# Patient Record
Sex: MALE | Race: WHITE | NOT HISPANIC OR LATINO | URBAN - METROPOLITAN AREA
[De-identification: names, ages, dates, MRNs, and addresses within clinical notes are randomized per-mention and may not be internally consistent; named-entity substitution may affect disease eponyms.]

---

## 2024-09-27 ENCOUNTER — TELEPHONE ENCOUNTER (OUTPATIENT)
Dept: URBAN - METROPOLITAN AREA CLINIC 35 | Facility: CLINIC | Age: 84
End: 2024-09-27

## 2024-09-27 ENCOUNTER — LAB OUTSIDE AN ENCOUNTER (OUTPATIENT)
Dept: URBAN - METROPOLITAN AREA CLINIC 31 | Facility: CLINIC | Age: 84
End: 2024-09-27

## 2024-09-27 ENCOUNTER — DASHBOARD ENCOUNTERS (OUTPATIENT)
Age: 84
End: 2024-09-27

## 2024-09-27 ENCOUNTER — OFFICE VISIT (OUTPATIENT)
Dept: URBAN - METROPOLITAN AREA CLINIC 31 | Facility: CLINIC | Age: 84
End: 2024-09-27
Payer: MEDICARE

## 2024-09-27 VITALS
HEART RATE: 74 BPM | BODY MASS INDEX: 22.48 KG/M2 | OXYGEN SATURATION: 96 % | DIASTOLIC BLOOD PRESSURE: 80 MMHG | WEIGHT: 166 LBS | HEIGHT: 72 IN | SYSTOLIC BLOOD PRESSURE: 140 MMHG

## 2024-09-27 DIAGNOSIS — E61.1 IRON DEFICIENCY: ICD-10-CM

## 2024-09-27 DIAGNOSIS — Z86.79 HISTORY OF ATRIAL FIBRILLATION: ICD-10-CM

## 2024-09-27 PROCEDURE — 99204 OFFICE O/P NEW MOD 45 MIN: CPT | Performed by: INTERNAL MEDICINE

## 2024-09-27 RX ORDER — APIXABAN 5 MG/1
AS DIRECTED TABLET, FILM COATED ORAL
Status: ACTIVE | COMMUNITY

## 2024-09-27 RX ORDER — TADALAFIL 10 MG/1
1 TABLET AS NEEDED TABLET, FILM COATED ORAL ONCE A DAY
Status: ACTIVE | COMMUNITY

## 2024-09-27 RX ORDER — ALLOPURINOL 100 MG/1
1 TABLET TABLET ORAL ONCE A DAY
Status: ACTIVE | COMMUNITY

## 2024-09-27 RX ORDER — ATORVASTATIN CALCIUM 40 MG/1
1 TABLET TABLET, FILM COATED ORAL ONCE A DAY
Status: ACTIVE | COMMUNITY

## 2024-09-27 NOTE — HPI-TODAY'S VISIT:
84-year-old male patient with previous history of  low iron presents today for consultation about Iron deficient   Denies a family history of esophageal, gastric, colon or rectal cancer.  Admits a past Colonoscopy and denies EGD.     Patient currently  admits 1 bowel movement per day with  normal consistency without pain, melena, blood, or mucus.    Patient admits having A-Fib, diagnosed approximately 2 years ago,  initially on Xarelto, and now has been taking Eliquis for 1 year. Of note patient is wearing a heart monitor (on day 15 from 30 days) Cardiologist Dr Yves Barraza ([facility] Cardiologist). Patient found to have 3-second pauses on EKG so heart monitor on.  Patient has been taking BC Powders at least 1-2 times a day, for right knee pain; states BC powders is the only thing that helps the pain. Patient has had 3 right knee surgeries.  No N/V No dysphagia Rare heartburn No weight loss No changes in bowels other than stools recently have been hard. Patient has been drinking a  protein and fruit drink   Labs - 09/04/2024  Fecal globin: WNL  CBC: WNL (Hbg 13.5 and HCT 41.6)    Labs 08/01/2023  Fe + TIBC + Ferritin: WNL  Lipid panel: WNL  CMP: WNL  HFP: WNL  TSH: WNL  CBC: WNL (Hbg 14.7 and HCT 44.4)

## 2024-11-15 ENCOUNTER — TELEPHONE ENCOUNTER (OUTPATIENT)
Dept: URBAN - METROPOLITAN AREA CLINIC 35 | Facility: CLINIC | Age: 84
End: 2024-11-15

## 2024-11-15 ENCOUNTER — OFFICE VISIT (OUTPATIENT)
Dept: URBAN - METROPOLITAN AREA MEDICAL CENTER 10 | Facility: MEDICAL CENTER | Age: 84
End: 2024-11-15
Payer: MEDICARE

## 2024-11-15 DIAGNOSIS — D12.3 ADENOMA OF TRANSVERSE COLON: ICD-10-CM

## 2024-11-15 DIAGNOSIS — K29.50 ANTRAL GASTRITIS: ICD-10-CM

## 2024-11-15 DIAGNOSIS — D50.9 ANEMIA: ICD-10-CM

## 2024-11-15 DIAGNOSIS — K31.819 ACQUIRED ARTERIOVENOUS MALFORMATION OF DUODENUM: ICD-10-CM

## 2024-11-15 PROCEDURE — 45380 COLONOSCOPY AND BIOPSY: CPT | Performed by: INTERNAL MEDICINE

## 2024-11-15 PROCEDURE — 43270 EGD LESION ABLATION: CPT | Performed by: INTERNAL MEDICINE

## 2024-11-15 PROCEDURE — 43239 EGD BIOPSY SINGLE/MULTIPLE: CPT | Performed by: INTERNAL MEDICINE

## 2024-11-15 RX ORDER — PANTOPRAZOLE SODIUM 40 MG/1
1 TABLET TABLET, DELAYED RELEASE ORAL
Qty: 30 | Refills: 2 | OUTPATIENT
Start: 2024-11-15

## 2024-11-15 RX ORDER — TADALAFIL 10 MG/1
1 TABLET AS NEEDED TABLET, FILM COATED ORAL ONCE A DAY
Status: ACTIVE | COMMUNITY

## 2024-11-15 RX ORDER — ALLOPURINOL 100 MG/1
1 TABLET TABLET ORAL ONCE A DAY
Status: ACTIVE | COMMUNITY

## 2024-11-15 RX ORDER — APIXABAN 5 MG/1
AS DIRECTED TABLET, FILM COATED ORAL
Status: ACTIVE | COMMUNITY

## 2024-11-15 RX ORDER — ATORVASTATIN CALCIUM 40 MG/1
1 TABLET TABLET, FILM COATED ORAL ONCE A DAY
Status: ACTIVE | COMMUNITY

## 2024-11-27 ENCOUNTER — OFFICE VISIT (OUTPATIENT)
Dept: URBAN - METROPOLITAN AREA CLINIC 33 | Facility: CLINIC | Age: 84
End: 2024-11-27
Payer: MEDICARE

## 2024-11-27 VITALS
HEIGHT: 72 IN | DIASTOLIC BLOOD PRESSURE: 84 MMHG | WEIGHT: 166 LBS | SYSTOLIC BLOOD PRESSURE: 138 MMHG | BODY MASS INDEX: 22.48 KG/M2

## 2024-11-27 DIAGNOSIS — K57.90 DIVERTICULOSIS: ICD-10-CM

## 2024-11-27 DIAGNOSIS — E61.1 IRON DEFICIENCY: ICD-10-CM

## 2024-11-27 DIAGNOSIS — K29.30 CHRONIC SUPERFICIAL GASTRITIS WITHOUT BLEEDING: ICD-10-CM

## 2024-11-27 DIAGNOSIS — K44.9 HIATAL HERNIA: ICD-10-CM

## 2024-11-27 DIAGNOSIS — D12.3 ADENOMATOUS POLYP OF TRANSVERSE COLON: ICD-10-CM

## 2024-11-27 DIAGNOSIS — Z86.79 HISTORY OF ATRIAL FIBRILLATION: ICD-10-CM

## 2024-11-27 PROBLEM — 397881000: Status: ACTIVE | Noted: 2024-11-27

## 2024-11-27 PROBLEM — 196735001: Status: ACTIVE | Noted: 2024-11-27

## 2024-11-27 PROCEDURE — 99214 OFFICE O/P EST MOD 30 MIN: CPT | Performed by: PHYSICIAN ASSISTANT

## 2024-11-27 RX ORDER — AMIODARONE HYDROCHLORIDE 100 MG/1
1 TABLET TABLET ORAL ONCE A DAY
Status: ACTIVE | COMMUNITY

## 2024-11-27 RX ORDER — IRON FUM,PS CMP/VIT C/NIACIN 125-40-3MG
1 CAPSULE BETWEEN MEALS CAPSULE ORAL ONCE A DAY
Qty: 30 | Refills: 3 | OUTPATIENT
Start: 2024-11-27

## 2024-11-27 RX ORDER — ALLOPURINOL 100 MG/1
1 TABLET TABLET ORAL ONCE A DAY
Status: ACTIVE | COMMUNITY

## 2024-11-27 RX ORDER — ATORVASTATIN CALCIUM 40 MG/1
1 TABLET TABLET, FILM COATED ORAL ONCE A DAY
Status: ACTIVE | COMMUNITY

## 2024-11-27 RX ORDER — PANTOPRAZOLE SODIUM 40 MG/1
1 TABLET TABLET, DELAYED RELEASE ORAL
Qty: 30 | Refills: 2 | Status: ACTIVE | COMMUNITY
Start: 2024-11-15

## 2024-11-27 RX ORDER — APIXABAN 5 MG/1
AS DIRECTED TABLET, FILM COATED ORAL
Status: ACTIVE | COMMUNITY

## 2024-11-27 RX ORDER — TADALAFIL 10 MG/1
1 TABLET AS NEEDED TABLET, FILM COATED ORAL ONCE A DAY
Status: ACTIVE | COMMUNITY

## 2024-11-27 NOTE — HPI-TODAY'S VISIT:
Patient presents today for a follow up from his procedures. Pt was started on Pantoprazole 40mg daily.  Colonoscopy report shows: - The examined portion of the ileum was normal.                       - One 3 mm polyp in the mid transverse colon, removed                        with a cold biopsy forceps. Resected and retrieved.                       - Diverticulosis in the sigmoid colon, in the                        descending colon, in the transverse colon and in the                        ascending colon  EGD report shows: - Z-line irregular, 38 cm from the incisors.   - LA Grade B reflux esophagitis with no bleeding. Rule                        out Branch's esophagus. Biopsied.   - 1 cm hiatal hernia.   - Erythematous mucosa in the gastric body. Biopsied.   - Erythematous mucosa in the antrum. Biopsied.   - A single non-bleeding angioectasia vs erosion in the                        stomach. Treated with argon plasma coagulation (APC).   - Normal examined duodenum.  Stomach, antrum, biopsy:Mild chronic inactive antral gastritis.Negative for intestinal metaplasia (Alcian blue/PAS stain).No H. pylori-like microorganisms on Giemsa stain. B. Stomach, body, biopsy:Minute fragment of gastric oxyntic mucosa with mild chronic inactive gastritis.Negative for intestinal metaplasia (Alcian blue/PAS stain).No H. pylori-like microorganisms on Giemsa stain. C. Duodenum, biopsy:Duodenal mucosa with no significant histopathologic abnormality.Histologic evidence of celiac sprue. D. Esophagus, biopsy:Squamous and cardiac- type gastric mucosa with no significant histopathologic abnormality.Negative for intestinal metaplasia (Alcian blue/PAS stain). E. Colon, mid transverse, polyp, biopsy/polypectomy:Tubular adenoma, fragments.  Last visit: 84-year-old male patient with previous history of  low iron presents today for consultation about Iron deficient   Denies a family history of esophageal, gastric, colon or rectal cancer.  Admits a past Colonoscopy and denies EGD.     Patient currently  admits 1 bowel movement per day with  normal consistency without pain, melena, blood, or mucus.    Patient admits having A-Fib, diagnosed approximately 2 years ago,  initially on Xarelto, and now has been taking Eliquis for 1 year. Of note patient is wearing a heart monitor (on day 15 from 30 days) Cardiologist Dr Yves Barraza ([facility] Cardiologist). Patient found to have 3-second pauses on EKG so heart monitor on.  Patient has been taking BC Powders at least 1-2 times a day, for right knee pain; states BC powders is the only thing that helps the pain. Patient has had 3 right knee surgeries.  No N/V No dysphagia Rare heartburn No weight loss No changes in bowels other than stools recently have been hard. Patient has been drinking a  protein and fruit drink   Labs - 09/04/2024  Fecal globin: WNL  CBC: WNL (Hbg 13.5 and HCT 41.6)    Labs 08/01/2023  Fe + TIBC + Ferritin: WNL  Lipid panel: WNL  CMP: WNL  HFP: WNL  TSH: WNL  CBC: WNL (Hbg 14.7 and HCT 44.4)

## 2025-02-26 ENCOUNTER — OFFICE VISIT (OUTPATIENT)
Dept: URBAN - METROPOLITAN AREA CLINIC 33 | Facility: CLINIC | Age: 85
End: 2025-02-26
Payer: MEDICARE

## 2025-02-26 VITALS
SYSTOLIC BLOOD PRESSURE: 136 MMHG | HEIGHT: 72 IN | DIASTOLIC BLOOD PRESSURE: 80 MMHG | BODY MASS INDEX: 22.75 KG/M2 | WEIGHT: 168 LBS

## 2025-02-26 DIAGNOSIS — Z86.79 HISTORY OF ATRIAL FIBRILLATION: ICD-10-CM

## 2025-02-26 DIAGNOSIS — K25.9 GASTRIC EROSION, UNSPECIFIED CHRONICITY: ICD-10-CM

## 2025-02-26 DIAGNOSIS — K57.90 DIVERTICULOSIS: ICD-10-CM

## 2025-02-26 DIAGNOSIS — D12.3 ADENOMATOUS POLYP OF TRANSVERSE COLON: ICD-10-CM

## 2025-02-26 DIAGNOSIS — E61.1 IRON DEFICIENCY: ICD-10-CM

## 2025-02-26 DIAGNOSIS — K29.30 CHRONIC SUPERFICIAL GASTRITIS WITHOUT BLEEDING: ICD-10-CM

## 2025-02-26 DIAGNOSIS — K44.9 HIATAL HERNIA: ICD-10-CM

## 2025-02-26 PROBLEM — 235651006: Status: ACTIVE | Noted: 2025-02-26

## 2025-02-26 PROCEDURE — 99213 OFFICE O/P EST LOW 20 MIN: CPT | Performed by: PHYSICIAN ASSISTANT

## 2025-02-26 RX ORDER — APIXABAN 5 MG/1
AS DIRECTED TABLET, FILM COATED ORAL
Status: ACTIVE | COMMUNITY

## 2025-02-26 RX ORDER — IRON FUM,PS CMP/VIT C/NIACIN 125-40-3MG
1 CAPSULE BETWEEN MEALS CAPSULE ORAL ONCE A DAY
Qty: 30 | Refills: 3 | OUTPATIENT

## 2025-02-26 RX ORDER — TADALAFIL 10 MG/1
1 TABLET AS NEEDED TABLET, FILM COATED ORAL ONCE A DAY
Status: ACTIVE | COMMUNITY

## 2025-02-26 RX ORDER — ATORVASTATIN CALCIUM 40 MG/1
1 TABLET TABLET, FILM COATED ORAL ONCE A DAY
Status: ACTIVE | COMMUNITY

## 2025-02-26 RX ORDER — ALLOPURINOL 100 MG/1
1 TABLET TABLET ORAL ONCE A DAY
Status: ACTIVE | COMMUNITY

## 2025-02-26 RX ORDER — PANTOPRAZOLE SODIUM 40 MG/1
1 TABLET TABLET, DELAYED RELEASE ORAL
Qty: 30 | Refills: 2 | Status: ON HOLD | COMMUNITY
Start: 2024-11-15

## 2025-02-26 RX ORDER — IRON FUM,PS CMP/VIT C/NIACIN 125-40-3MG
1 CAPSULE BETWEEN MEALS CAPSULE ORAL ONCE A DAY
Qty: 30 | Refills: 3 | Status: ACTIVE | COMMUNITY
Start: 2024-11-27

## 2025-02-26 RX ORDER — AMIODARONE HYDROCHLORIDE 100 MG/1
1 TABLET TABLET ORAL ONCE A DAY
Status: ON HOLD | COMMUNITY

## 2025-02-26 NOTE — HPI-TODAY'S VISIT:
Patient presents today for a follow up. He admits starting Integra Capsule, 62.5-62.5-40-3 MG daily. He admits avoiding BC powders and is only taking as needed instead of twice a day. Last labs (1/22/2025) at [facility]. Patient denies any overt bleeding, no melena, no abd pain.  He does have chronic knee pain and feels BC powder works the best.  HGB: 14.0 Hematocrit: 42.9 Ferritin: 55.8 Iron: 120    Last visit (11/27/2024): Patient presents today for a follow up from his procedures. Pt was started on Pantoprazole 40mg daily.  Colonoscopy report shows: - The examined portion of the ileum was normal.                       - One 3 mm polyp in the mid transverse colon, removed                        with a cold biopsy forceps. Resected and retrieved.                       - Diverticulosis in the sigmoid colon, in the                        descending colon, in the transverse colon and in the                        ascending colon  EGD report shows: - Z-line irregular, 38 cm from the incisors.   - LA Grade B reflux esophagitis with no bleeding. Rule                        out Branch's esophagus. Biopsied.   - 1 cm hiatal hernia.   - Erythematous mucosa in the gastric body. Biopsied.   - Erythematous mucosa in the antrum. Biopsied.   - A single non-bleeding angioectasia vs erosion in the                        stomach. Treated with argon plasma coagulation (APC).   - Normal examined duodenum.  Stomach, antrum, biopsy:Mild chronic inactive antral gastritis.Negative for intestinal metaplasia (Alcian blue/PAS stain).No H. pylori-like microorganisms on Giemsa stain. B. Stomach, body, biopsy:Minute fragment of gastric oxyntic mucosa with mild chronic inactive gastritis.Negative for intestinal metaplasia (Alcian blue/PAS stain).No H. pylori-like microorganisms on Giemsa stain. C. Duodenum, biopsy:Duodenal mucosa with no significant histopathologic abnormality.Histologic evidence of celiac sprue. D. Esophagus, biopsy:Squamous and cardiac- type gastric mucosa with no significant histopathologic abnormality.Negative for intestinal metaplasia (Alcian blue/PAS stain). E. Colon, mid transverse, polyp, biopsy/polypectomy:Tubular adenoma, fragments.  Last visit: 84-year-old male patient with previous history of  low iron presents today for consultation about Iron deficient   Denies a family history of esophageal, gastric, colon or rectal cancer.  Admits a past Colonoscopy and denies EGD.     Patient currently  admits 1 bowel movement per day with  normal consistency without pain, melena, blood, or mucus.    Patient admits having A-Fib, diagnosed approximately 2 years ago,  initially on Xarelto, and now has been taking Eliquis for 1 year. Of note patient is wearing a heart monitor (on day 15 from 30 days) Cardiologist Dr Yves Barraza ([facility] Cardiologist). Patient found to have 3-second pauses on EKG so heart monitor on.  Patient has been taking BC Powders at least 1-2 times a day, for right knee pain; states BC powders is the only thing that helps the pain. Patient has had 3 right knee surgeries.  No N/V No dysphagia Rare heartburn No weight loss No changes in bowels other than stools recently have been hard. Patient has been drinking a  protein and fruit drink   Labs - 09/04/2024  Fecal globin: WNL  CBC: WNL (Hbg 13.5 and HCT 41.6)    Labs 08/01/2023  Fe + TIBC + Ferritin: WNL  Lipid panel: WNL  CMP: WNL  HFP: WNL  TSH: WNL  CBC: WNL (Hbg 14.7 and HCT 44.4)

## 2025-04-09 ENCOUNTER — TELEPHONE ENCOUNTER (OUTPATIENT)
Dept: URBAN - METROPOLITAN AREA CLINIC 35 | Facility: CLINIC | Age: 85
End: 2025-04-09

## 2025-04-09 RX ORDER — IRON FUM,PS CMP/VIT C/NIACIN 125-40-3MG
1 CAPSULE BETWEEN MEALS CAPSULE ORAL ONCE A DAY
Qty: 90 | Refills: 1